# Patient Record
Sex: MALE | Race: WHITE | HISPANIC OR LATINO | Employment: UNEMPLOYED | URBAN - METROPOLITAN AREA
[De-identification: names, ages, dates, MRNs, and addresses within clinical notes are randomized per-mention and may not be internally consistent; named-entity substitution may affect disease eponyms.]

---

## 2023-10-17 ENCOUNTER — HOSPITAL ENCOUNTER (EMERGENCY)
Facility: CLINIC | Age: 38
Discharge: HOME | End: 2023-10-17
Attending: EMERGENCY MEDICINE | Admitting: EMERGENCY MEDICINE

## 2023-10-17 VITALS
WEIGHT: 164 LBS | HEART RATE: 86 BPM | SYSTOLIC BLOOD PRESSURE: 139 MMHG | DIASTOLIC BLOOD PRESSURE: 87 MMHG | OXYGEN SATURATION: 98 % | TEMPERATURE: 98.3 F | RESPIRATION RATE: 15 BRPM

## 2023-10-17 DIAGNOSIS — F15.10 METHAMPHETAMINE USE (H): ICD-10-CM

## 2023-10-17 DIAGNOSIS — E87.6 HYPOKALEMIA: ICD-10-CM

## 2023-10-17 DIAGNOSIS — R94.31 PROLONGED Q-T INTERVAL ON ECG: ICD-10-CM

## 2023-10-17 LAB
ANION GAP SERPL CALCULATED.3IONS-SCNC: 17 MMOL/L (ref 7–15)
ANION GAP SERPL CALCULATED.3IONS-SCNC: 28 MMOL/L (ref 7–15)
ATRIAL RATE - MUSE: 102 BPM
ATRIAL RATE - MUSE: 83 BPM
BASO+EOS+MONOS # BLD AUTO: ABNORMAL 10*3/UL
BASO+EOS+MONOS NFR BLD AUTO: ABNORMAL %
BASOPHILS # BLD AUTO: 0 10E3/UL (ref 0–0.2)
BASOPHILS NFR BLD AUTO: 0 %
BUN SERPL-MCNC: 8.5 MG/DL (ref 6–20)
BUN SERPL-MCNC: 9.5 MG/DL (ref 6–20)
CALCIUM SERPL-MCNC: 8.4 MG/DL (ref 8.6–10)
CALCIUM SERPL-MCNC: 9.2 MG/DL (ref 8.6–10)
CHLORIDE SERPL-SCNC: 102 MMOL/L (ref 98–107)
CHLORIDE SERPL-SCNC: 92 MMOL/L (ref 98–107)
CREAT SERPL-MCNC: 1.02 MG/DL (ref 0.67–1.17)
CREAT SERPL-MCNC: 1.1 MG/DL (ref 0.67–1.17)
DEPRECATED HCO3 PLAS-SCNC: 14 MMOL/L (ref 22–29)
DEPRECATED HCO3 PLAS-SCNC: 17 MMOL/L (ref 22–29)
DIASTOLIC BLOOD PRESSURE - MUSE: NORMAL MMHG
DIASTOLIC BLOOD PRESSURE - MUSE: NORMAL MMHG
EGFRCR SERPLBLD CKD-EPI 2021: 90 ML/MIN/1.73M2
EGFRCR SERPLBLD CKD-EPI 2021: >90 ML/MIN/1.73M2
EOSINOPHIL # BLD AUTO: 0.1 10E3/UL (ref 0–0.7)
EOSINOPHIL NFR BLD AUTO: 1 %
ERYTHROCYTE [DISTWIDTH] IN BLOOD BY AUTOMATED COUNT: 11.8 % (ref 10–15)
ETHANOL SERPL-MCNC: <0.01 G/DL
GLUCOSE SERPL-MCNC: 103 MG/DL (ref 70–99)
GLUCOSE SERPL-MCNC: 217 MG/DL (ref 70–99)
HCT VFR BLD AUTO: 40.5 % (ref 40–53)
HGB BLD-MCNC: 14 G/DL (ref 13.3–17.7)
HOLD SPECIMEN: NORMAL
IMM GRANULOCYTES # BLD: 0.1 10E3/UL
IMM GRANULOCYTES NFR BLD: 1 %
INTERPRETATION ECG - MUSE: NORMAL
INTERPRETATION ECG - MUSE: NORMAL
LYMPHOCYTES # BLD AUTO: 0.8 10E3/UL (ref 0.8–5.3)
LYMPHOCYTES NFR BLD AUTO: 5 %
MAGNESIUM SERPL-MCNC: 3.1 MG/DL (ref 1.7–2.3)
MCH RBC QN AUTO: 31.4 PG (ref 26.5–33)
MCHC RBC AUTO-ENTMCNC: 34.6 G/DL (ref 31.5–36.5)
MCV RBC AUTO: 91 FL (ref 78–100)
MONOCYTES # BLD AUTO: 0.6 10E3/UL (ref 0–1.3)
MONOCYTES NFR BLD AUTO: 4 %
NEUTROPHILS # BLD AUTO: 14.5 10E3/UL (ref 1.6–8.3)
NEUTROPHILS NFR BLD AUTO: 89 %
NRBC # BLD AUTO: 0 10E3/UL
NRBC BLD AUTO-RTO: 0 /100
P AXIS - MUSE: 68 DEGREES
P AXIS - MUSE: 81 DEGREES
PLATELET # BLD AUTO: 271 10E3/UL (ref 150–450)
POTASSIUM SERPL-SCNC: 3 MMOL/L (ref 3.4–5.3)
POTASSIUM SERPL-SCNC: 3.9 MMOL/L (ref 3.4–5.3)
PR INTERVAL - MUSE: 116 MS
PR INTERVAL - MUSE: 118 MS
QRS DURATION - MUSE: 86 MS
QRS DURATION - MUSE: 88 MS
QT - MUSE: 370 MS
QT - MUSE: 396 MS
QTC - MUSE: 434 MS
QTC - MUSE: 516 MS
R AXIS - MUSE: 60 DEGREES
R AXIS - MUSE: 68 DEGREES
RBC # BLD AUTO: 4.46 10E6/UL (ref 4.4–5.9)
SODIUM SERPL-SCNC: 134 MMOL/L (ref 135–145)
SODIUM SERPL-SCNC: 136 MMOL/L (ref 135–145)
SYSTOLIC BLOOD PRESSURE - MUSE: NORMAL MMHG
SYSTOLIC BLOOD PRESSURE - MUSE: NORMAL MMHG
T AXIS - MUSE: 17 DEGREES
T AXIS - MUSE: 38 DEGREES
VENTRICULAR RATE- MUSE: 102 BPM
VENTRICULAR RATE- MUSE: 83 BPM
WBC # BLD AUTO: 16.1 10E3/UL (ref 4–11)

## 2023-10-17 PROCEDURE — 85025 COMPLETE CBC W/AUTO DIFF WBC: CPT | Performed by: EMERGENCY MEDICINE

## 2023-10-17 PROCEDURE — 250N000011 HC RX IP 250 OP 636: Mod: JZ | Performed by: EMERGENCY MEDICINE

## 2023-10-17 PROCEDURE — 83735 ASSAY OF MAGNESIUM: CPT | Performed by: EMERGENCY MEDICINE

## 2023-10-17 PROCEDURE — 93005 ELECTROCARDIOGRAM TRACING: CPT | Mod: RTG

## 2023-10-17 PROCEDURE — 250N000013 HC RX MED GY IP 250 OP 250 PS 637: Performed by: EMERGENCY MEDICINE

## 2023-10-17 PROCEDURE — 96365 THER/PROPH/DIAG IV INF INIT: CPT

## 2023-10-17 PROCEDURE — 85004 AUTOMATED DIFF WBC COUNT: CPT | Performed by: EMERGENCY MEDICINE

## 2023-10-17 PROCEDURE — 99285 EMERGENCY DEPT VISIT HI MDM: CPT | Mod: 25

## 2023-10-17 PROCEDURE — 82077 ASSAY SPEC XCP UR&BREATH IA: CPT | Performed by: EMERGENCY MEDICINE

## 2023-10-17 PROCEDURE — 36415 COLL VENOUS BLD VENIPUNCTURE: CPT | Performed by: EMERGENCY MEDICINE

## 2023-10-17 PROCEDURE — 80048 BASIC METABOLIC PNL TOTAL CA: CPT | Performed by: EMERGENCY MEDICINE

## 2023-10-17 PROCEDURE — 258N000003 HC RX IP 258 OP 636: Performed by: EMERGENCY MEDICINE

## 2023-10-17 RX ORDER — MAGNESIUM SULFATE HEPTAHYDRATE 40 MG/ML
2 INJECTION, SOLUTION INTRAVENOUS ONCE
Status: DISCONTINUED | OUTPATIENT
Start: 2023-10-17 | End: 2023-10-17

## 2023-10-17 RX ORDER — POTASSIUM CHLORIDE 1.5 G/1.58G
20 POWDER, FOR SOLUTION ORAL ONCE
Status: COMPLETED | OUTPATIENT
Start: 2023-10-17 | End: 2023-10-17

## 2023-10-17 RX ORDER — POTASSIUM CHLORIDE 7.45 MG/ML
10 INJECTION INTRAVENOUS ONCE
Status: COMPLETED | OUTPATIENT
Start: 2023-10-17 | End: 2023-10-17

## 2023-10-17 RX ADMIN — SODIUM CHLORIDE 1000 ML: 9 INJECTION, SOLUTION INTRAVENOUS at 07:33

## 2023-10-17 RX ADMIN — POTASSIUM CHLORIDE 10 MEQ: 7.46 INJECTION, SOLUTION INTRAVENOUS at 07:36

## 2023-10-17 RX ADMIN — POTASSIUM CHLORIDE 20 MEQ: 1.5 POWDER, FOR SOLUTION ORAL at 07:37

## 2023-10-17 ASSESSMENT — ACTIVITIES OF DAILY LIVING (ADL)
ADLS_ACUITY_SCORE: 35
ADLS_ACUITY_SCORE: 35

## 2023-10-17 NOTE — ED PROVIDER NOTES
"  History     Chief Complaint:  Altered Mental Status       HPI   Antwan Shetty is a 37 year old male methamphetamine use.  Paramedics report that they were driving slowly coming up to a stoplight when the patient started banging on their door.  They were alarmed and called EMS.  The patient was crying and in distress.  The patient was not redirectable.  The patient appeared to be needing medical help.  The patient did require restraints and droperidol.  He received 5 mg of droperidol via IV.  Here, the patient reports that he used \"go fast\" which he states is methamphetamine.  He reports that he smoked it.  He denies any alcohol or other substance use.  He denies any IV drug use.  The patient reports that he recently ended a relationship with his girlfriend and has been staying with his sister recently.  He reports his sister has been helping him and trying to get out of substance use.  The patient states he is not suicidal or homicidal.  The patient denies any headache, chest pain or abdominal pain.  The patient denies auditory or visual hallucinations.  The patient has no additional medical concerns he wants addressed.      Independent Historian:   None - Patient Only    Review of External Notes:   None, nothing available in Mobile system or care everywhere      Medications:    No current outpatient medications on file.      Past Medical History:    No past medical history on file.    Past Surgical History:    No past surgical history on file.     Physical Exam   Patient Vitals for the past 24 hrs:   BP Temp Pulse Resp SpO2 Weight   10/17/23 0930 -- -- 96 (!) 38 -- --   10/17/23 0800 139/87 -- 81 24 98 % --   10/17/23 0730 118/68 -- 80 23 98 % --   10/17/23 0715 139/85 -- 83 19 95 % --   10/17/23 0700 131/81 -- 87 22 92 % --   10/17/23 0614 (!) 145/91 98.3  F (36.8  C) 106 16 94 % 74.4 kg (164 lb)        Physical Exam  General:  Sitting on bed.  Patient laying on bed and is cooperative and " pleasant.  HENT:  No obvious trauma to head  Right Ear:  External ear normal.   Left Ear:  External ear normal.   Nose:  Nose normal.   Eyes:  Conjunctivae and EOM are normal. Pupils are equal, round, and reactive.   Neck: Normal range of motion. Neck supple. No tracheal deviation present.   CV:  Normal heart sounds. No murmur heard.  Pulm/Chest: Effort normal and breath sounds normal.   Abd: Soft. No distension. There is no tenderness. There is no rigidity, no rebound and no guarding.   M/S: Normal range of motion.   Neuro: Awake and alert.   Skin: Skin is warm and dry. No rash noted. Not diaphoretic.   Psych: Normal mood and affect. Behavior is normal.     Emergency Department Course   ECG  ECG results from 10/17/23   EKG 12-lead, tracing only     Value    Systolic Blood Pressure     Diastolic Blood Pressure     Ventricular Rate 102    Atrial Rate 102    CA Interval 118    QRS Duration 88        QTc 516    P Axis 68    R AXIS 60    T Axis 17    Interpretation ECG      Sinus tachycardia  Minimal voltage criteria for LVH, may be normal variant ( Sokolow-Mon )  T wave abnormality, consider anterolateral ischemia  Abnormal ECG  No previous ECGs available  Confirmed by GENERATED REPORT, COMPUTER ([id number]),  Jaguar Kim ([id number]) on 10/17/2023 6:20:53 AM     EKG 12-lead, tracing only     Value    Systolic Blood Pressure     Diastolic Blood Pressure     Ventricular Rate 83    Atrial Rate 83    CA Interval 116    QRS Duration 86        QTc 434    P Axis 81    R AXIS 68    T Axis 38    Interpretation ECG      Sinus rhythm  Minimal voltage criteria for LVH, may be normal variant ( Sokolow-Mon )  Nonspecific T wave abnormality  Abnormal ECG  When compared with ECG of 17-OCT-2023 06:13,  T wave inversion no longer evident in Anterior leads  QT has shortened  Confirmed by GENERATED REPORT, COMPUTER ([id number]),  GIULIANO PRADO ([id number]) on 10/17/2023 9:54:08 AM       Laboratory:  Labs Ordered and Resulted  from Time of ED Arrival to Time of ED Departure   BASIC METABOLIC PANEL - Abnormal       Result Value    Sodium 134 (*)     Potassium 3.0 (*)     Chloride 92 (*)     Carbon Dioxide (CO2) 14 (*)     Anion Gap 28 (*)     Urea Nitrogen 8.5      Creatinine 1.10      GFR Estimate 90      Calcium 9.2      Glucose 217 (*)    CBC WITH PLATELETS AND DIFFERENTIAL - Abnormal    WBC Count 16.1 (*)     RBC Count 4.46      Hemoglobin 14.0      Hematocrit 40.5      MCV 91      MCH 31.4      MCHC 34.6      RDW 11.8      Platelet Count 271      % Neutrophils 89      % Lymphocytes 5      % Monocytes 4      Mids % (Monos, Eos, Basos)        % Eosinophils 1      % Basophils 0      % Immature Granulocytes 1      NRBCs per 100 WBC 0      Absolute Neutrophils 14.5 (*)     Absolute Lymphocytes 0.8      Absolute Monocytes 0.6      Mids Abs (Monos, Eos, Basos)        Absolute Eosinophils 0.1      Absolute Basophils 0.0      Absolute Immature Granulocytes 0.1      Absolute NRBCs 0.0     MAGNESIUM - Abnormal    Magnesium 3.1 (*)    BASIC METABOLIC PANEL - Abnormal    Sodium 136      Potassium 3.9      Chloride 102      Carbon Dioxide (CO2) 17 (*)     Anion Gap 17 (*)     Urea Nitrogen 9.5      Creatinine 1.02      GFR Estimate >90      Calcium 8.4 (*)     Glucose 103 (*)    ETHYL ALCOHOL LEVEL - Normal    Alcohol ethyl <0.01        Emergency Department Course & Assessments:  Interventions:  Medications   sodium chloride 0.9% BOLUS 1,000 mL (0 mLs Intravenous Stopped 10/17/23 0825)   potassium chloride 10 mEq in 100 mL sterile water infusion (0 mEq Intravenous Stopped 10/17/23 0825)   potassium chloride (KLOR-CON) Packet 20 mEq (20 mEq Oral $Given 10/17/23 0737)        Assessments:  As below    Independent Interpretation (X-rays, CTs, rhythm strip):  Cardiac monitor rhythm strip shows a normal sinus rhythm    Consultations/Discussion of Management or Tests:   ED Course as of 10/17/23 1013   Tue Oct 17, 2023   0703 I evaluated the patient,  obtained the above history and performed the physical exam.   0944 Reassessed patient.  EKG shows improvement in the QT and is back to normal.  Patient received IV potassium and drink part of his oral potassium, but did not want to finish it.  Repeat BMP pending.   1011 Rechecked patient, he is feeling much better.  He is calling his sister to pick him up.  Encouraged sobriety.     Social Determinants of Health affecting care:   Homelessness/Housing Insecurity    Disposition:  The patient was discharged to home.     Impression & Plan    Medical Decision Making:  Antwan Shetty is a very pleasant 37 year old year old patient who presents to the emergency department with concern of methamphetamine use.  Paramedics report they were driving when the patient approached the vehicle and tried to get in.  The patient was acting erratically.  EMS called.  He required IV droperidol and restraints.  Patient out of restraints once in the ED.  Patient calm and cooperative.  Patient admits to using methamphetamine via smoking.  Patient denies any other substance use.  Patient was kept on the cardiac monitor.  Patient's QTc is prolonged.  Magnesium initially ordered, but level did come back high at 3.1.  Potassium is low so that was replaced both oral and IV.  Patient provided IV fluids.  I appreciate the anion gap and low bicarb which is likely the result of his substance use and erratic behavior earlier.  Patient monitored in the ED.  He was kept on the cardiac monitor and had no dysrhythmias.  Patient is not suicidal or homicidal.  No indication for hold at this time.  Sister contacted who will be able to  the patient.  Repeat EKG shows QT is back to normal and BMP shows improvement with potassium improving, anion gap closing, bicarb increasing and patient is feeling much better after treatment of the potassium and receiving IV fluids.  His sister is able to pick him up.  Encouraged sobriety.  The patient expressed  verbal understanding and agreement.    The treatment plan was discussed with the patient and they expressed understanding of this plan and consented to the plan.  In addition, the patient will return to the emergency department if their symptoms persist, worsen, if new symptoms arise or if there is any concern as other pathology may be present that is not evident at this time. They also understand the importance of close follow up in the clinic and if unable to do so will return to the emergency department for a reevaluation. All questions were answered.    Diagnosis:    ICD-10-CM    1. Methamphetamine use (H)  F15.10       2. Prolonged Q-T interval on ECG  R94.31     resolved      3. Hypokalemia  E87.6            Discharge Medications:  New Prescriptions    No medications on file     10/17/2023   Ralph Sanchez, Ralph Sher DO  10/17/23 1013

## 2023-10-17 NOTE — ED TRIAGE NOTES
"[facility]  ED Arrival Note      Means of Arrival: EMS    Story: EMS crew was responding to another call when the patient approached the ambulance and tried to open the outside closet. Then he proceeded to bang on the back doors of the trucks asking for help. Patient would not cooperate with EMS crew and was acting erratically so PD was called. Patient was assisted to the stretcher, placed on handcuffs, and given 5mg od Droperidol IV. Patient admitted to taking \"go fast\", which contains meth. Currently calm, able to follow commands.         Current behavior: Calm and Cooperative      Physical Appearance: Untidy and Disheveled      Safety Concerns: Drug use reported     Legal Hold Status: [facility]    1:1 sitter: 1to1 sitter : N/A    Constant Monitoring, Q15 and flowsheet initiated: N/A    Patient therapeutically Searched: Security wanded    Patient changed into scrubs: No    Belongings: Remain with patient    Video Observation initiated and patient informed: Yes                      "

## 2023-10-17 NOTE — ED NOTES
Video Observation initiated, patient informed.     Pt belongings placed in locker. Pt searched by security Kathryn Winters RN